# Patient Record
Sex: MALE | Race: WHITE
[De-identification: names, ages, dates, MRNs, and addresses within clinical notes are randomized per-mention and may not be internally consistent; named-entity substitution may affect disease eponyms.]

---

## 2021-06-22 ENCOUNTER — HOSPITAL ENCOUNTER (OUTPATIENT)
Dept: HOSPITAL 79 - OPSV2 | Age: 56
End: 2021-06-22
Attending: ORTHOPAEDIC SURGERY
Payer: COMMERCIAL

## 2021-06-22 DIAGNOSIS — M16.11: ICD-10-CM

## 2021-06-22 DIAGNOSIS — Z01.818: Primary | ICD-10-CM

## 2021-06-22 DIAGNOSIS — I45.10: ICD-10-CM

## 2021-06-22 LAB
BUN/CREATININE RATIO: 25 (ref 0–10)
HGB BLD-MCNC: 16.1 GM/DL (ref 14–17.5)
RED BLOOD COUNT: 5.02 M/UL (ref 4.2–5.5)
WHITE BLOOD COUNT: 12.6 K/UL (ref 4.5–11)

## 2021-06-30 ENCOUNTER — HOSPITAL ENCOUNTER (OUTPATIENT)
Dept: HOSPITAL 79 - HEART 5 | Age: 56
End: 2021-06-30
Attending: INTERNAL MEDICINE
Payer: COMMERCIAL

## 2021-06-30 DIAGNOSIS — I35.0: Primary | ICD-10-CM

## 2021-07-23 ENCOUNTER — HOSPITAL ENCOUNTER (OUTPATIENT)
Dept: HOSPITAL 79 - LAB | Age: 56
End: 2021-07-23
Attending: INTERNAL MEDICINE
Payer: COMMERCIAL

## 2021-07-23 DIAGNOSIS — E11.9: ICD-10-CM

## 2021-07-23 DIAGNOSIS — I35.0: Primary | ICD-10-CM

## 2021-07-23 DIAGNOSIS — I10: ICD-10-CM

## 2021-07-23 DIAGNOSIS — R07.9: ICD-10-CM

## 2021-07-23 DIAGNOSIS — E78.5: ICD-10-CM

## 2021-07-23 DIAGNOSIS — R53.83: ICD-10-CM

## 2021-07-23 LAB
BUN/CREATININE RATIO: 21 (ref 0–10)
HGB BLD-MCNC: 15 GM/DL (ref 14–17.5)
RED BLOOD COUNT: 4.69 M/UL (ref 4.2–5.5)
WHITE BLOOD COUNT: 11.2 K/UL (ref 4.5–11)

## 2021-08-09 ENCOUNTER — HOSPITAL ENCOUNTER (OUTPATIENT)
Dept: HOSPITAL 79 - CATH | Age: 56
End: 2021-08-09
Attending: INTERNAL MEDICINE
Payer: COMMERCIAL

## 2021-08-09 DIAGNOSIS — E78.5: ICD-10-CM

## 2021-08-09 DIAGNOSIS — I35.0: ICD-10-CM

## 2021-08-09 DIAGNOSIS — I11.9: ICD-10-CM

## 2021-08-09 DIAGNOSIS — Z20.822: ICD-10-CM

## 2021-08-09 DIAGNOSIS — F17.200: ICD-10-CM

## 2021-08-09 DIAGNOSIS — E11.9: ICD-10-CM

## 2021-08-09 DIAGNOSIS — I42.2: ICD-10-CM

## 2021-08-09 DIAGNOSIS — I25.118: Primary | ICD-10-CM

## 2021-08-09 PROCEDURE — C1894 INTRO/SHEATH, NON-LASER: HCPCS

## 2021-08-09 PROCEDURE — C1769 GUIDE WIRE: HCPCS

## 2021-11-15 ENCOUNTER — HOSPITAL ENCOUNTER (OUTPATIENT)
Dept: HOSPITAL 79 - OPSV2 | Age: 56
End: 2021-11-15
Attending: ORTHOPAEDIC SURGERY
Payer: COMMERCIAL

## 2021-11-15 DIAGNOSIS — M16.11: ICD-10-CM

## 2021-11-15 DIAGNOSIS — Z01.818: Primary | ICD-10-CM

## 2021-11-15 LAB
BUN/CREATININE RATIO: 27 (ref 0–10)
HGB BLD-MCNC: 15.8 GM/DL (ref 14–17.5)
RED BLOOD COUNT: 4.86 M/UL (ref 4.2–5.5)
WHITE BLOOD COUNT: 16.2 K/UL (ref 4.5–11)

## 2021-11-29 ENCOUNTER — HOSPITAL ENCOUNTER (OUTPATIENT)
Dept: HOSPITAL 79 - LAB | Age: 56
End: 2021-11-29
Attending: ORTHOPAEDIC SURGERY
Payer: COMMERCIAL

## 2021-11-29 DIAGNOSIS — Z01.812: Primary | ICD-10-CM

## 2021-11-29 LAB — BUN/CREATININE RATIO: 25 (ref 0–10)

## 2021-11-30 ENCOUNTER — HOSPITAL ENCOUNTER (INPATIENT)
Dept: HOSPITAL 79 - OR | Age: 56
LOS: 1 days | Discharge: HOME | DRG: 470 | End: 2021-12-01
Attending: ORTHOPAEDIC SURGERY | Admitting: ORTHOPAEDIC SURGERY
Payer: COMMERCIAL

## 2021-11-30 VITALS — BODY MASS INDEX: 30.76 KG/M2 | HEIGHT: 67 IN | WEIGHT: 196 LBS

## 2021-11-30 DIAGNOSIS — F41.9: ICD-10-CM

## 2021-11-30 DIAGNOSIS — I35.0: ICD-10-CM

## 2021-11-30 DIAGNOSIS — E11.9: ICD-10-CM

## 2021-11-30 DIAGNOSIS — Z79.4: ICD-10-CM

## 2021-11-30 DIAGNOSIS — I10: ICD-10-CM

## 2021-11-30 DIAGNOSIS — Z79.82: ICD-10-CM

## 2021-11-30 DIAGNOSIS — Z28.21: ICD-10-CM

## 2021-11-30 DIAGNOSIS — M16.0: Primary | ICD-10-CM

## 2021-11-30 DIAGNOSIS — F17.210: ICD-10-CM

## 2021-11-30 DIAGNOSIS — Z20.822: ICD-10-CM

## 2021-11-30 DIAGNOSIS — I25.10: ICD-10-CM

## 2021-11-30 DIAGNOSIS — E78.5: ICD-10-CM

## 2021-11-30 DIAGNOSIS — Z90.49: ICD-10-CM

## 2021-11-30 DIAGNOSIS — I42.2: ICD-10-CM

## 2021-11-30 DIAGNOSIS — Z82.49: ICD-10-CM

## 2021-11-30 DIAGNOSIS — I45.10: ICD-10-CM

## 2021-11-30 LAB
HGB BLD-MCNC: 14.5 GM/DL (ref 14–17.5)
RED BLOOD COUNT: 4.6 M/UL (ref 4.2–5.5)
WHITE BLOOD COUNT: 12.1 K/UL (ref 4.5–11)

## 2021-11-30 PROCEDURE — 0SR903A REPLACEMENT OF RIGHT HIP JOINT WITH CERAMIC SYNTHETIC SUBSTITUTE, UNCEMENTED, OPEN APPROACH: ICD-10-PCS | Performed by: ORTHOPAEDIC SURGERY

## 2021-11-30 PROCEDURE — C1776 JOINT DEVICE (IMPLANTABLE): HCPCS

## 2021-12-01 LAB
BUN/CREATININE RATIO: 23 (ref 0–10)
HGB BLD-MCNC: 11.5 GM/DL (ref 14–17.5)
RED BLOOD COUNT: 3.68 M/UL (ref 4.2–5.5)
WHITE BLOOD COUNT: 17.3 K/UL (ref 4.5–11)

## 2021-12-01 NOTE — NUR
patient reported he ambulates using walker with assistance from PT on the
hallway and able to and tolerated well.

## 2021-12-01 NOTE — NUR
AFTER PT GOT INTO BED AT 2300 WITH ASSISTANCE FROM PRIMARY RN AND CNA, POLAR
ICE WAS APPLIED TO RIGHT HIP. PT WORE POLAR ICE CONTINUOUSLY FOR ENTIRE SHIFT
THEREAFTER. ICE WAS REFILLED AT 2300 AND 0500.